# Patient Record
Sex: FEMALE | Race: OTHER | HISPANIC OR LATINO | URBAN - METROPOLITAN AREA
[De-identification: names, ages, dates, MRNs, and addresses within clinical notes are randomized per-mention and may not be internally consistent; named-entity substitution may affect disease eponyms.]

---

## 2023-05-12 ENCOUNTER — HOSPITAL ENCOUNTER (EMERGENCY)
Facility: HOSPITAL | Age: 38
Discharge: HOME | End: 2023-05-12
Attending: EMERGENCY MEDICINE

## 2023-05-12 VITALS
WEIGHT: 134.92 LBS | SYSTOLIC BLOOD PRESSURE: 126 MMHG | HEART RATE: 80 BPM | RESPIRATION RATE: 18 BRPM | DIASTOLIC BLOOD PRESSURE: 62 MMHG | OXYGEN SATURATION: 100 % | TEMPERATURE: 98.1 F

## 2023-05-12 DIAGNOSIS — N92.0 MENORRHAGIA: Primary | ICD-10-CM

## 2023-05-12 LAB
BACTERIA UR QL AUTO: ABNORMAL /HPF
BILIRUB UR QL STRIP: NEGATIVE
CLARITY UR: CLEAR
COLOR UR: ABNORMAL
EXT PREGNANCY TEST URINE: NEGATIVE
EXT. CONTROL: NORMAL
GLUCOSE UR STRIP-MCNC: NEGATIVE MG/DL
HGB UR QL STRIP.AUTO: ABNORMAL
KETONES UR STRIP-MCNC: NEGATIVE MG/DL
LEUKOCYTE ESTERASE UR QL STRIP: NEGATIVE
MUCOUS THREADS UR QL AUTO: ABNORMAL
NITRITE UR QL STRIP: NEGATIVE
NON-SQ EPI CELLS URNS QL MICRO: ABNORMAL /HPF
PH UR STRIP.AUTO: 6 [PH]
PROT UR STRIP-MCNC: NEGATIVE MG/DL
RBC #/AREA URNS AUTO: ABNORMAL /HPF
SP GR UR STRIP.AUTO: 1.01 (ref 1–1.03)
UROBILINOGEN UR STRIP-ACNC: <2 MG/DL
WBC #/AREA URNS AUTO: ABNORMAL /HPF

## 2023-05-12 RX ORDER — KETOROLAC TROMETHAMINE 30 MG/ML
15 INJECTION, SOLUTION INTRAMUSCULAR; INTRAVENOUS ONCE
Status: COMPLETED | OUTPATIENT
Start: 2023-05-12 | End: 2023-05-12

## 2023-05-12 RX ADMIN — KETOROLAC TROMETHAMINE 15 MG: 30 INJECTION, SOLUTION INTRAMUSCULAR; INTRAVENOUS at 15:55

## 2023-05-12 NOTE — Clinical Note
Nya Mcallister was seen and treated in our emergency department on 5/12/2023  Diagnosis:     Sheri Laya  is off the rest of the shift today  She may return on this date: If you have any questions or concerns, please don't hesitate to call        Arlene Soler, DO    ______________________________           _______________          _______________  Hospital Representative                              Date                                Time

## 2023-05-12 NOTE — ED PROVIDER NOTES
"History  Chief Complaint   Patient presents with   • Vaginal Bleeding     Vaginal bleeding since yesterday  44year old female, , with no major past medical history presents for evaluation with vaginal bleeding  She states that the bleeding started yesterday  She thought at first that it was just her normal menstrual period, but she says that she was a week late  She also noticed that she was bleeding more than normal, and seemed to be passing large clots  She says that over the past 24 hours, she has used approximately 4-5 pads  Today, she passed some \"tissue\" that did not appear to be a blood clot, and she became concerned  Patient states that she did take a home pregnancy test which was negative  She endorses associated lower abdominal and low back discomfort, more on the right side  She denies any recent fevers, chills, nausea, vomiting, or lightheadedness  Vaginal Bleeding  Associated symptoms: abdominal pain and back pain    Associated symptoms: no fever and no nausea        None       History reviewed  No pertinent past medical history  History reviewed  No pertinent surgical history  History reviewed  No pertinent family history  I have reviewed and agree with the history as documented  E-Cigarette/Vaping   • E-Cigarette Use Never User      E-Cigarette/Vaping Substances     Social History     Tobacco Use   • Smoking status: Never   • Smokeless tobacco: Never   Vaping Use   • Vaping Use: Never used   Substance Use Topics   • Drug use: Not Currently        Review of Systems   Constitutional: Negative for chills and fever  Respiratory: Negative for shortness of breath  Gastrointestinal: Positive for abdominal pain  Negative for nausea and vomiting  Genitourinary: Positive for pelvic pain and vaginal bleeding  Musculoskeletal: Positive for back pain  Neurological: Negative for light-headedness  All other systems reviewed and are negative        Physical Exam  ED Triage " Vitals   Temperature Pulse Respirations Blood Pressure SpO2   05/12/23 1412 05/12/23 1412 05/12/23 1412 05/12/23 1412 05/12/23 1412   98 1 °F (36 7 °C) 80 18 126/62 100 %      Temp src Heart Rate Source Patient Position - Orthostatic VS BP Location FiO2 (%)   -- -- 05/12/23 1412 05/12/23 1412 --     Sitting Right arm       Pain Score       05/12/23 1555       8             Orthostatic Vital Signs  Vitals:    05/12/23 1412   BP: 126/62   Pulse: 80   Patient Position - Orthostatic VS: Sitting       Physical Exam  Vitals and nursing note reviewed  Constitutional:       General: She is awake  She is not in acute distress  Appearance: She is not toxic-appearing  HENT:      Head: Normocephalic and atraumatic  Eyes:      General: Vision grossly intact  Gaze aligned appropriately  Cardiovascular:      Rate and Rhythm: Normal rate and regular rhythm  Heart sounds: Normal heart sounds  Pulmonary:      Effort: Pulmonary effort is normal  No respiratory distress  Breath sounds: Normal breath sounds  Abdominal:      General: There is no distension  Palpations: Abdomen is soft  Tenderness: There is abdominal tenderness (mild, lower abdomen)  Musculoskeletal:      Cervical back: Full passive range of motion without pain and neck supple  Skin:     General: Skin is warm and dry  Neurological:      General: No focal deficit present  Mental Status: She is alert and oriented to person, place, and time           ED Medications  Medications   ketorolac (TORADOL) injection 15 mg (15 mg Intramuscular Given 5/12/23 1555)       Diagnostic Studies  Results Reviewed     Procedure Component Value Units Date/Time    Urine Microscopic [[id number]]  (Abnormal) Collected: 05/12/23 1457    Lab Status: Final result Specimen: Urine, Clean Catch Updated: 05/12/23 1541     RBC, UA Innumerable /hpf      WBC, UA 10-20 /hpf      Epithelial Cells Occasional /hpf      Bacteria, UA None Seen /hpf      MUCUS THREADS Occasional    Urine culture [[id number]] Collected: 05/12/23 1454    Lab Status: In process Specimen: Urine, Clean Catch Updated: 05/12/23 1541    UA w Reflex to Microscopic w Reflex to Culture [[id number]]  (Abnormal) Collected: 05/12/23 1454    Lab Status: Final result Specimen: Urine, Clean Catch Updated: 05/12/23 1536     Color, UA Light Yellow     Clarity, UA Clear     Specific Gravity, UA 1 009     pH, UA 6 0     Leukocytes, UA Negative     Nitrite, UA Negative     Protein, UA Negative mg/dl      Glucose, UA Negative mg/dl      Ketones, UA Negative mg/dl      Urobilinogen, UA <2 0 mg/dl      Bilirubin, UA Negative     Occult Blood, UA Large    POCT pregnancy, urine [[id number]]  (Normal) Resulted: 05/12/23 1535    Lab Status: Final result Updated: 05/12/23 1535     EXT Preg Test, Ur Negative     Control Valid                 No orders to display         Procedures  Procedures      ED Course                             SBIRT 20yo+    Flowsheet Row Most Recent Value   Initial Alcohol Screen: US AUDIT-C     1  How often do you have a drink containing alcohol? 0 Filed at: 05/12/2023 1601   2  How many drinks containing alcohol do you have on a typical day you are drinking? 0 Filed at: 05/12/2023 1601   3a  Male UNDER 65: How often do you have five or more drinks on one occasion? 0 Filed at: 05/12/2023 1601   3b  FEMALE Any Age, or MALE 65+: How often do you have 4 or more drinks on one occassion? 0 Filed at: 05/12/2023 1601   Audit-C Score 0 Filed at: 05/12/2023 1601   KALEN: How many times in the past year have you    Used an illegal drug or used a prescription medication for non-medical reasons? Never Filed at: 05/12/2023 1601                Medical Decision Making  50-year-old female presents for evaluation with vaginal bleeding  Patient reports associated abdominal cramping and passage of some clots and tissue  Patient is not soaking through pads, and is only using 4-5 pads per day    On exam, patient with normal vitals, in no acute distress  Mild lower abdominal tenderness, otherwise benign exam   Differential diagnosis includes, but is not limited to, spontaneous , normal menstrual period, or other intrauterine pathology  Will check urine pregnancy, as well as UA  Patient's urine pregnancy test negative at this time, no concern for early pregnancy, spontaneous , or ectopic pregnancy  Urinalysis without signs of urinary tract infection  Suspect that patient's bleeding is just a heavy menstrual period, and that abdominal discomfort is just normal cramping  Patient given Toradol for symptomatic treatment  She was told to follow-up with her OB/GYN for further evaluation  Patient discharged home in stable condition with symptomatic care instructions and strict ED return precautions  Menorrhagia: acute illness or injury  Amount and/or Complexity of Data Reviewed  Labs: ordered  Risk  Prescription drug management  Disposition  Final diagnoses:   Menorrhagia     Time reflects when diagnosis was documented in both MDM as applicable and the Disposition within this note     Time User Action Codes Description Comment    2023  3:44 PM Giovanni Horacio Add [N93 9] Vaginal bleeding     2023  3:47 PM Giovanni Horacio Remove [N93 9] Vaginal bleeding     2023  3:47 PM Giovanni Horacio Add [N92 0] Menorrhagia       ED Disposition     ED Disposition   Discharge    Condition   Stable    Date/Time   Fri May 12, 2023  3:44 PM    Comment   Ara Pastor discharge to home/self care                 Follow-up Information     Follow up With Specialties Details Why Contact Info Additional [address] Ob/Gyn Obstetrics and Gynecology Schedule an appointment as soon as possible for a visit in 1 week if symptoms persist [address] Emergency Department Emergency Medicine Go to  If symptoms worsen [address] [place] [id number]  [address] [place] [address] Emergency Department, [address] [place], [place], [address]          There are no discharge medications for this patient  No discharge procedures on file  PDMP Review     None           ED Provider  Attending physically available and evaluated [facility]  I managed the patient along with the ED Attending      Electronically Signed by         Michael Mendes DO  05/12/23 1951

## 2023-05-12 NOTE — ED ATTENDING ATTESTATION
2023  ILoren, , saw and evaluated the patient  I have discussed the patient with the resident/non-physician practitioner and agree with the resident's/non-physician practitioner's findings, Plan of Care, and MDM as documented in the resident's/non-physician practitioner's note, except where noted  All available labs and Radiology studies were reviewed  I was present for key portions of any procedure(s) performed by the resident/non-physician practitioner and I was immediately available to provide assistance  At this point I agree with the current assessment done in the Emergency Department  I have conducted an independent evaluation of this patient a history and physical is as follows:37y  one week late for period, vaginal bleeding w/ clots starting yesterday  Reports lower abd cramping/low back pain  Denies sig lh, no n/v, no dysuria  No other co   Exam WNWD nad, mmm, neck supple, resp non-labored, cv regular rate, abd nd, ext no cce, skin dry, neuro non-focal, normal mood        ED Course     Labs Reviewed   UA W REFLEX TO MICROSCOPIC WITH REFLEX TO CULTURE - Abnormal       Result Value Ref Range Status    Color, UA Light Yellow   Final    Clarity, UA Clear   Final    Specific Gravity, UA 1 009  1 003 - 1 030 Final    pH, UA 6 0  4 5, 5 0, 5 5, 6 0, 6 5, 7 0, 7 5, 8 0 Final    Leukocytes, UA Negative  Negative Final    Nitrite, UA Negative  Negative Final    Protein, UA Negative  Negative mg/dl Final    Glucose, UA Negative  Negative mg/dl Final    Ketones, UA Negative  Negative mg/dl Final    Urobilinogen, UA <2 0  <2 0 mg/dl mg/dl Final    Bilirubin, UA Negative  Negative Final    Occult Blood, UA Large (*) Negative Final   URINE MICROSCOPIC - Abnormal    RBC, UA Innumerable (*) None Seen, 1-2 /hpf Final    WBC, UA 10-20 (*) None Seen, 1-2 /hpf Final    Epithelial Cells Occasional  None Seen, Occasional /hpf Final    Bacteria, UA None Seen  None Seen, Occasional /hpf Final MUCUS THREADS Occasional (*) None Seen Final   POCT PREGNANCY, URINE - Normal    EXT Preg Test, Ur Negative   Final    Control Valid   Final   URINE CULTURE     No orders to display         Critical Care Time  Procedures    1  Menorrhagia          Time reflects when diagnosis was documented in both MDM as applicable and the Disposition within this note     Time User Action Codes Description Comment    5/12/2023  3:44 PM Charmian Aid Add [N93 9] Vaginal bleeding     5/12/2023  3:47 PM Charmian Aid Remove [N93 9] Vaginal bleeding     5/12/2023  3:47 PM Charmian Aid Add [N92 0] Menorrhagia       ED Disposition     ED Disposition   Discharge    Condition   Stable    Date/Time   Fri May 12, 2023  3:44 PM    Comment   Sheri Pastor discharge to home/self care                 Follow-up Information     Follow up With Specialties Details Why Contact Info Additional Information    [facility] Ob/Gyn Obstetrics and Gynecology Schedule an appointment as soon as possible for a visit in 1 week if symptoms persist [address] [facility]       [address]  Emergency Department Emergency Medicine Go to  If symptoms worsen [facility] [phone]  [address] [facility] Emergency Department, [address] [facility] [address]  , [place], [place], [address]

## 2023-05-13 LAB — BACTERIA UR CULT: NORMAL

## 2024-02-03 ENCOUNTER — HOSPITAL ENCOUNTER (EMERGENCY)
Facility: HOSPITAL | Age: 39
Discharge: HOME | End: 2024-02-03
Attending: EMERGENCY MEDICINE
Payer: COMMERCIAL

## 2024-02-03 VITALS
OXYGEN SATURATION: 100 % | DIASTOLIC BLOOD PRESSURE: 60 MMHG | HEART RATE: 91 BPM | WEIGHT: 129.85 LBS | SYSTOLIC BLOOD PRESSURE: 92 MMHG | TEMPERATURE: 97.6 F | RESPIRATION RATE: 16 BRPM

## 2024-02-03 DIAGNOSIS — R10.2 PELVIC PAIN: ICD-10-CM

## 2024-02-03 DIAGNOSIS — K59.00 CONSTIPATION: ICD-10-CM

## 2024-02-03 DIAGNOSIS — D64.9 ANEMIA: Primary | ICD-10-CM

## 2024-02-03 LAB
ALBUMIN SERPL BCP-MCNC: 3.7 G/DL (ref 3.5–5)
ALP SERPL-CCNC: 41 U/L (ref 34–104)
ALT SERPL W P-5'-P-CCNC: 20 U/L (ref 7–52)
ANION GAP SERPL CALCULATED.3IONS-SCNC: 6 MMOL/L
ANISOCYTOSIS BLD QL SMEAR: PRESENT
AST SERPL W P-5'-P-CCNC: 20 U/L (ref 13–39)
BACTERIA UR QL AUTO: ABNORMAL /HPF
BASOPHILS # BLD AUTO: 0.01 THOUSANDS/ÂΜL (ref 0–0.1)
BASOPHILS NFR BLD AUTO: 0 % (ref 0–1)
BILIRUB DIRECT SERPL-MCNC: 0.1 MG/DL (ref 0–0.2)
BILIRUB SERPL-MCNC: 0.36 MG/DL (ref 0.2–1)
BILIRUB UR QL STRIP: ABNORMAL
BUN SERPL-MCNC: 6 MG/DL (ref 5–25)
CALCIUM SERPL-MCNC: 8.8 MG/DL (ref 8.4–10.2)
CHLORIDE SERPL-SCNC: 104 MMOL/L (ref 96–108)
CLARITY UR: CLEAR
CO2 SERPL-SCNC: 27 MMOL/L (ref 21–32)
COLOR UR: ABNORMAL
CREAT SERPL-MCNC: 0.68 MG/DL (ref 0.6–1.3)
EOSINOPHIL # BLD AUTO: 0.06 THOUSAND/ÂΜL (ref 0–0.61)
EOSINOPHIL NFR BLD AUTO: 1 % (ref 0–6)
ERYTHROCYTE [DISTWIDTH] IN BLOOD BY AUTOMATED COUNT: 27.1 % (ref 11.6–15.1)
EXT PREGNANCY TEST URINE: NEGATIVE
EXT. CONTROL: NORMAL
GFR SERPL CREATININE-BSD FRML MDRD: 110 ML/MIN/1.73SQ M
GLUCOSE SERPL-MCNC: 111 MG/DL (ref 65–140)
GLUCOSE UR STRIP-MCNC: NEGATIVE MG/DL
HCT VFR BLD AUTO: 24.6 % (ref 34.8–46.1)
HGB BLD-MCNC: 7.9 G/DL (ref 11.5–15.4)
HGB UR QL STRIP.AUTO: ABNORMAL
HYPERCHROMIA BLD QL SMEAR: PRESENT
IMM GRANULOCYTES # BLD AUTO: 0.02 THOUSAND/UL (ref 0–0.2)
IMM GRANULOCYTES NFR BLD AUTO: 0 % (ref 0–2)
KETONES UR STRIP-MCNC: NEGATIVE MG/DL
LEUKOCYTE ESTERASE UR QL STRIP: NEGATIVE
LIPASE SERPL-CCNC: 10 U/L (ref 11–82)
LYMPHOCYTES # BLD AUTO: 0.72 THOUSANDS/ÂΜL (ref 0.6–4.47)
LYMPHOCYTES NFR BLD AUTO: 8 % (ref 14–44)
MAGNESIUM SERPL-MCNC: 1.9 MG/DL (ref 1.9–2.7)
MCH RBC QN AUTO: 25.9 PG (ref 26.8–34.3)
MCHC RBC AUTO-ENTMCNC: 32.1 G/DL (ref 31.4–37.4)
MCV RBC AUTO: 81 FL (ref 82–98)
MONOCYTES # BLD AUTO: 0.59 THOUSAND/ÂΜL (ref 0.17–1.22)
MONOCYTES NFR BLD AUTO: 7 % (ref 4–12)
MUCOUS THREADS UR QL AUTO: ABNORMAL
NEUTROPHILS # BLD AUTO: 7.36 THOUSANDS/ÂΜL (ref 1.85–7.62)
NEUTS SEG NFR BLD AUTO: 84 % (ref 43–75)
NITRITE UR QL STRIP: NEGATIVE
NON-SQ EPI CELLS URNS QL MICRO: ABNORMAL /HPF
NRBC BLD AUTO-RTO: 0 /100 WBCS
OVALOCYTES BLD QL SMEAR: PRESENT
PH UR STRIP.AUTO: 7 [PH] (ref 4.5–8)
PLATELET # BLD AUTO: 250 THOUSANDS/UL (ref 149–390)
PLATELET BLD QL SMEAR: ADEQUATE
PMV BLD AUTO: 10.1 FL (ref 8.9–12.7)
POLYCHROMASIA BLD QL SMEAR: PRESENT
POTASSIUM SERPL-SCNC: 3.9 MMOL/L (ref 3.5–5.3)
PROT SERPL-MCNC: 6.6 G/DL (ref 6.4–8.4)
PROT UR STRIP-MCNC: ABNORMAL MG/DL
RBC # BLD AUTO: 3.05 MILLION/UL (ref 3.81–5.12)
RBC #/AREA URNS AUTO: ABNORMAL /HPF
RBC MORPH BLD: PRESENT
SODIUM SERPL-SCNC: 137 MMOL/L (ref 135–147)
SP GR UR STRIP.AUTO: 1.02 (ref 1–1.03)
UROBILINOGEN UR QL STRIP.AUTO: 0.2 E.U./DL
WBC # BLD AUTO: 8.76 THOUSAND/UL (ref 4.31–10.16)
WBC #/AREA URNS AUTO: ABNORMAL /HPF

## 2024-02-03 PROCEDURE — 81001 URINALYSIS AUTO W/SCOPE: CPT

## 2024-02-03 PROCEDURE — 80048 BASIC METABOLIC PNL TOTAL CA: CPT | Performed by: EMERGENCY MEDICINE

## 2024-02-03 PROCEDURE — 96375 TX/PRO/DX INJ NEW DRUG ADDON: CPT

## 2024-02-03 PROCEDURE — 36415 COLL VENOUS BLD VENIPUNCTURE: CPT | Performed by: EMERGENCY MEDICINE

## 2024-02-03 PROCEDURE — 80076 HEPATIC FUNCTION PANEL: CPT | Performed by: EMERGENCY MEDICINE

## 2024-02-03 PROCEDURE — 99283 EMERGENCY DEPT VISIT LOW MDM: CPT

## 2024-02-03 PROCEDURE — 83735 ASSAY OF MAGNESIUM: CPT | Performed by: EMERGENCY MEDICINE

## 2024-02-03 PROCEDURE — 96374 THER/PROPH/DIAG INJ IV PUSH: CPT

## 2024-02-03 PROCEDURE — 85025 COMPLETE CBC W/AUTO DIFF WBC: CPT | Performed by: EMERGENCY MEDICINE

## 2024-02-03 PROCEDURE — 83690 ASSAY OF LIPASE: CPT | Performed by: EMERGENCY MEDICINE

## 2024-02-03 PROCEDURE — 99284 EMERGENCY DEPT VISIT MOD MDM: CPT | Performed by: EMERGENCY MEDICINE

## 2024-02-03 PROCEDURE — 96361 HYDRATE IV INFUSION ADD-ON: CPT

## 2024-02-03 PROCEDURE — 81025 URINE PREGNANCY TEST: CPT | Performed by: EMERGENCY MEDICINE

## 2024-02-03 RX ORDER — NAPROXEN 500 MG/1
500 TABLET ORAL 2 TIMES DAILY WITH MEALS
Qty: 20 TABLET | Refills: 0 | Status: SHIPPED | OUTPATIENT
Start: 2024-02-03 | End: 2024-02-13

## 2024-02-03 RX ORDER — DOCUSATE SODIUM 100 MG/1
100 CAPSULE, LIQUID FILLED ORAL EVERY 12 HOURS
Qty: 60 CAPSULE | Refills: 0 | Status: SHIPPED | OUTPATIENT
Start: 2024-02-03

## 2024-02-03 RX ORDER — ONDANSETRON 2 MG/ML
4 INJECTION INTRAMUSCULAR; INTRAVENOUS ONCE
Status: COMPLETED | OUTPATIENT
Start: 2024-02-03 | End: 2024-02-03

## 2024-02-03 RX ORDER — KETOROLAC TROMETHAMINE 30 MG/ML
15 INJECTION, SOLUTION INTRAMUSCULAR; INTRAVENOUS ONCE
Status: COMPLETED | OUTPATIENT
Start: 2024-02-03 | End: 2024-02-03

## 2024-02-03 RX ADMIN — SODIUM CHLORIDE 1000 ML: 0.9 INJECTION, SOLUTION INTRAVENOUS at 13:36

## 2024-02-03 RX ADMIN — KETOROLAC TROMETHAMINE 15 MG: 30 INJECTION, SOLUTION INTRAMUSCULAR; INTRAVENOUS at 13:35

## 2024-02-03 RX ADMIN — ONDANSETRON 4 MG: 2 INJECTION INTRAMUSCULAR; INTRAVENOUS at 13:35

## 2024-02-03 NOTE — ED PROVIDER NOTES
History  Chief Complaint   Patient presents with    Abdominal Pain     Pt reports lower abdominal pain beginning 26th. States was on period and noted more bleeding than usual. States light bleeding still. nausea     38 YO female presents with lower abdominal pain for the last 7 days. She states pain has been pulsing, constant, waxing and waning. She has had associated nausea, constipation. She notes her last bowel movement was yesterday. She notes heavy period that began 2 days prior, this included clots. She was evaluated in another ED twice, saw a gynecologist and her PCP for this. Testing did demonstrate a fibroid uterus which was felt to be the cause for her symptoms. Notes bleeding has improved though she continues to have some spotting. She does have known anemia. Pt denies CP/SOB/F/C/D/C, no dysuria, burning on urination or blood in urine.       History provided by:  Patient   used: No        None       History reviewed. No pertinent past medical history.    History reviewed. No pertinent surgical history.    History reviewed. No pertinent family history.  I have reviewed and agree with the history as documented.    E-Cigarette/Vaping    E-Cigarette Use Never User      E-Cigarette/Vaping Substances     Social History     Tobacco Use    Smoking status: Never    Smokeless tobacco: Never   Vaping Use    Vaping status: Never Used   Substance Use Topics    Drug use: Not Currently       Review of Systems   Constitutional:  Negative for chills, fatigue and fever.   HENT:  Negative for dental problem.    Eyes:  Negative for visual disturbance.   Respiratory:  Negative for shortness of breath.    Cardiovascular:  Negative for chest pain.   Gastrointestinal:  Negative for abdominal pain, diarrhea and vomiting.   Genitourinary:  Positive for pelvic pain and vaginal bleeding. Negative for dysuria and frequency.   Musculoskeletal:  Negative for arthralgias.   Skin:  Negative for rash.   Neurological:   Negative for dizziness, weakness and light-headedness.   Psychiatric/Behavioral:  Negative for agitation, behavioral problems and confusion.    All other systems reviewed and are negative.      Physical Exam  Physical Exam  Vitals and nursing note reviewed.   Constitutional:       Appearance: Normal appearance.   HENT:      Head: Normocephalic and atraumatic.   Eyes:      Extraocular Movements: Extraocular movements intact.      Conjunctiva/sclera: Conjunctivae normal.   Cardiovascular:      Rate and Rhythm: Normal rate.   Pulmonary:      Effort: Pulmonary effort is normal.   Abdominal:      General: There is no distension.      Tenderness: There is abdominal tenderness in the right lower quadrant, suprapubic area and left lower quadrant. There is no guarding or rebound.   Musculoskeletal:         General: Normal range of motion.      Cervical back: Normal range of motion.   Skin:     Findings: No rash.   Neurological:      General: No focal deficit present.      Mental Status: She is alert.      Cranial Nerves: No cranial nerve deficit.   Psychiatric:         Mood and Affect: Mood normal.         Vital Signs  ED Triage Vitals   Temperature Pulse Respirations Blood Pressure SpO2   02/03/24 1229 02/03/24 1229 02/03/24 1229 02/03/24 1229 02/03/24 1229   97.6 °F (36.4 °C) (!) 116 16 116/66 100 %      Temp Source Heart Rate Source Patient Position - Orthostatic VS BP Location FiO2 (%)   02/03/24 1229 02/03/24 1229 -- -- --   Oral Monitor         Pain Score       02/03/24 1335       8           Vitals:    02/03/24 1229 02/03/24 1543   BP: 116/66 92/60   Pulse: (!) 116 91         Visual Acuity      ED Medications  Medications   sodium chloride 0.9 % bolus 1,000 mL (1,000 mL Intravenous New Bag 2/3/24 1336)   ondansetron (ZOFRAN) injection 4 mg (4 mg Intravenous Given 2/3/24 1335)   ketorolac (TORADOL) injection 15 mg (15 mg Intravenous Given 2/3/24 1335)       Diagnostic Studies  Results Reviewed       Procedure Component  Value Units Date/Time    Smear Review(Phlebs Do Not Order) [[id number]] Collected: 02/03/24 1341    Lab Status: Final result Specimen: Blood from Arm, Left Updated: 02/03/24 1425     RBC Morphology Present     Platelet Estimate Adequate     Anisocytosis Present     Hypochromia Present     Ovalocytes Present     Polychromasia Present    CBC and differential [[id number]]  (Abnormal) Collected: 02/03/24 1341    Lab Status: Final result Specimen: Blood from Arm, Left Updated: 02/03/24 1425     WBC 8.76 Thousand/uL      RBC 3.05 Million/uL      Hemoglobin 7.9 g/dL      Hematocrit 24.6 %      MCV 81 fL      MCH 25.9 pg      MCHC 32.1 g/dL      RDW 27.1 %      MPV 10.1 fL      Platelets 250 Thousands/uL      nRBC 0 /100 WBCs      Neutrophils Relative 84 %      Immat GRANS % 0 %      Lymphocytes Relative 8 %      Monocytes Relative 7 %      Eosinophils Relative 1 %      Basophils Relative 0 %      Neutrophils Absolute 7.36 Thousands/µL      Immature Grans Absolute 0.02 Thousand/uL      Lymphocytes Absolute 0.72 Thousands/µL      Monocytes Absolute 0.59 Thousand/µL      Eosinophils Absolute 0.06 Thousand/µL      Basophils Absolute 0.01 Thousands/µL     Narrative:      This is an appended report.  These results have been appended to a previously verified report.    Basic metabolic panel [[id number]] Collected: 02/03/24 1341    Lab Status: Final result Specimen: Blood from Arm, Left Updated: 02/03/24 1407     Sodium 137 mmol/L      Potassium 3.9 mmol/L      Chloride 104 mmol/L      CO2 27 mmol/L      ANION GAP 6 mmol/L      BUN 6 mg/dL      Creatinine 0.68 mg/dL      Glucose 111 mg/dL      Calcium 8.8 mg/dL      eGFR 110 ml/min/1.73sq m     Narrative:      National Kidney Disease Foundation guidelines for Chronic Kidney Disease (CKD):     Stage 1 with normal or high GFR (GFR > 90 mL/min/1.73 square meters)    Stage 2 Mild CKD (GFR = 60-89 mL/min/1.73 square meters)    Stage 3A Moderate CKD (GFR = 45-59 mL/min/1.73 square  meters)    Stage 3B Moderate CKD (GFR = 30-44 mL/min/1.73 square meters)    Stage 4 Severe CKD (GFR = 15-29 mL/min/1.73 square meters)    Stage 5 End Stage CKD (GFR <15 mL/min/1.73 square meters)  Note: GFR calculation is accurate only with a steady state creatinine    Hepatic function panel [[id number]]  (Normal) Collected: 02/03/24 1341    Lab Status: Final result Specimen: Blood from Arm, Left Updated: 02/03/24 1407     Total Bilirubin 0.36 mg/dL      Bilirubin, Direct 0.10 mg/dL      Alkaline Phosphatase 41 U/L      AST 20 U/L      ALT 20 U/L      Total Protein 6.6 g/dL      Albumin 3.7 g/dL     Magnesium [[id number]]  (Normal) Collected: 02/03/24 1341    Lab Status: Final result Specimen: Blood from Arm, Left Updated: 02/03/24 1407     Magnesium 1.9 mg/dL     Lipase [[id number]]  (Abnormal) Collected: 02/03/24 1341    Lab Status: Final result Specimen: Blood from Arm, Left Updated: 02/03/24 1407     Lipase 10 u/L     Urine Microscopic [[id number]]  (Abnormal) Collected: 02/03/24 1305    Lab Status: Final result Specimen: Urine, Clean Catch Updated: 02/03/24 1401     RBC, UA 2-4 /hpf      WBC, UA 1-2 /hpf      Epithelial Cells Occasional /hpf      Bacteria, UA Occasional /hpf      MUCUS THREADS Innumerable    POCT pregnancy, urine [[id number]]  (Normal) Resulted: 02/03/24 1310    Lab Status: Final result Updated: 02/03/24 1310     EXT Preg Test, Ur Negative     Control Valid    Urine Macroscopic, POC [[id number]]  (Abnormal) Collected: 02/03/24 1305    Lab Status: Final result Specimen: Urine Updated: 02/03/24 1306     Color, UA Simran     Clarity, UA Clear     pH, UA 7.0     Leukocytes, UA Negative     Nitrite, UA Negative     Protein, UA 30 (1+) mg/dl      Glucose, UA Negative mg/dl      Ketones, UA Negative mg/dl      Urobilinogen, UA 0.2 E.U./dl      Bilirubin, UA Small     Occult Blood, UA Trace     Specific Gravity, UA 1.020    Narrative:      CLINITEK RESULT                   No orders to display               Procedures  Procedures         ED Course  ED Course as of 02/03/24 1544   Sat Feb 03, 2024   1440 Hemoglobin(!): 7.9  8.7 four days ago, patient states bleeding has improved, currently with spotting.                                             Medical Decision Making  1. Abdominal pain - Patient recently evaluated at [facility], labs which demonstrated anemia and an U/S with fibroid uterus. Will recheck CBC to determine extent of anemia, metabolic panel for electrolyte abnormalities and dehydration, will give fluids, NSAIDs for discomfort.    Problems Addressed:  Anemia: chronic illness or injury  Constipation: acute illness or injury  Pelvic pain: chronic illness or injury    Amount and/or Complexity of Data Reviewed  External Data Reviewed: notes.  Labs: ordered. Decision-making details documented in ED Course.    Risk  OTC drugs.  Prescription drug management.             Disposition  Final diagnoses:   Anemia   Constipation   Pelvic pain     Time reflects when diagnosis was documented in both MDM as applicable and the Disposition within this note       Time User Action Codes Description Comment    2/3/2024  3:26 PM Marlon Ward [D64.9] Anemia     2/3/2024  3:26 PM Marlon Ward Add [K59.00] Constipation     2/3/2024  3:26 PM Marlon Ward Add [R10.2] Pelvic pain           ED Disposition       ED Disposition   Discharge    Condition   Stable    Date/Time   Sat Feb 3, 2024  3:26 PM    Comment   Justin Pastor discharge to home/self care.                   Follow-up Information    None         Patient's Medications   Discharge Prescriptions    DOCUSATE SODIUM (COLACE) 100 MG CAPSULE    Take 1 capsule (100 mg total) by mouth every 12 (twelve) hours       Start Date: 2/3/2024  End Date: --       Order Dose: 100 mg       Quantity: 60 capsule    Refills: 0    NAPROXEN (NAPROSYN) 500 MG TABLET    Take 1 tablet (500 mg total) by mouth 2 (two) times a day with meals for 10 days       Start Date: 2/3/2024   End Date: 2/13/2024       Order Dose: 500 mg       Quantity: 20 tablet    Refills: 0       No discharge procedures on file.    PDMP Review       None            ED Provider  Electronically Signed by             Marlon Ward MD  02/03/24 9111

## 2024-02-03 NOTE — DISCHARGE INSTRUCTIONS
Take the Naprosyn twice daily for the next 5-10 days.    Start taking your iron supplement every day instead of every other day.    Take the Colace twice daily while you take the iron.    Follow back up with your gynecologist for further evaluation and management. Return to the ER for worsening shortness of breath, fatigue and lightheadedness.    [place] Naprosyn dos veces al día dot los próximos 5 a 10 días.    Empiece a sapna [place] suplemento de micheal todos los días en lugar de cada dos jerad.    [place] Colace dos veces al día mientras prudencio la plancha.    Vuelva a consultar con angeles ginecólogo para agustín evaluación y tratamiento adicionales. Regrese a la gabby de emergencias si empeora la dificultad para respirar, la fatiga y el aturdimiento.

## 2024-02-14 PROBLEM — D25.1 INTRAMURAL UTERINE FIBROID: Status: ACTIVE | Noted: 2024-02-02

## 2024-02-14 PROBLEM — A74.9 CHLAMYDIA: Status: ACTIVE | Noted: 2024-01-18

## 2024-02-14 NOTE — PROGRESS NOTES
Patient is primarily Yakut speaking [employer]  # [id number], Gi henley for translation during today's visit.     Assessment/Plan:      Diagnoses and all orders for this visit:    Anemia, unspecified type  -     TSH, 3rd generation; Future  -     T4, free; Future  -     medroxyPROGESTERone (DEPO-PROVERA) 150 mg/mL injection; Inject 1 mL (150 mg total) into a muscle every 3 (three) months    Menorrhagia with regular cycle  -     TSH, 3rd generation; Future  -     T4, free; Future  -     medroxyPROGESTERone (DEPO-PROVERA) 150 mg/mL injection; Inject 1 mL (150 mg total) into a muscle every 3 (three) months    Uterine leiomyoma, unspecified location  -     medroxyPROGESTERone (DEPO-PROVERA) 150 mg/mL injection; Inject 1 mL (150 mg total) into a muscle every 3 (three) months    Other orders  -     norethindrone (AYGESTIN) 5 mg tablet; Take 10 mg by mouth daily  -     Tranexamic Acid 650 MG TABS; Take 1,300 mg by mouth 3 (three) times a day as needed  -     Ferrous Sulfate Dried 200 (65 Fe) MG TABS; Take 1 tablet by mouth 2 (two) times a day      - reviewed options for menstrual control including  LARCs.  Reviewed endometrial ablation and briefly hysterectomy.  Patient is most interested in Depo-Provera injections at this time.  Considering pregnancy.  Written information provided regarding Mirena IUD  -Reviewed with patient Depo-Provera injections are every 3 months.  Common side effects are irregular vaginal bleeding including amenorrhea and weight gain.  Written information provided.  Aware of delayed return of fertility  -Patient will continue Aygestin 10 mg for 2 months.  Has Lysteda as needed if heavy bleeding returns.  -Reviewed with patient tubal occlusion and if she desires pregnancy would need referral for [facility] and in vitro fertilization.  Partner is currently in [place].  Is not interested at this time in pursuing pregnancy  - encouraged to follow up with hematology for iron infusions.  Encouraged  to continue oral iron as ordered  -Signs and symptoms to report reviewed    RTO for Depo-Provera injections and 3 to 4 months for annual exam/ follow-up    Subjective:     Patient ID: Justin Pastor is a 38 y.o. female.    HPI  here to discuss AUB. Menarche at age 11. LMP 23  Menses are typically monthly  lasting 7 days . Bleeding is described as heavy for 3 days.  Necessitating pad changes hourly.  LMP  24 had prolonged bleeding up until about 1 week ago.  Bleeding is described as daily and heavy.  Was changing a pad 2-3 times per hour.  Seen in  ED 2/3/24 H/H 7.9.6. Known history of anemia. Has met with hematology and received IV iron infusions in the past. Has known - stable fibroid since 2023 approximately 6cm.  Was prescribed lysteda and aygestin within the past month.  Denies associated symptoms.  Denies alleviating or aggravating factors.  Is not using any other OTC remedies.  Has met with Dr. Alves for hysterectomy consult patient was not interested in hysterectomy at that time. Is sexually active partner in  [place]. HSG resulted bilateral tubal occlusions.  is interested in pregnancy. Goal of today's visit lighter/more regular menstrual cycles    Pelvic US - 24  FINDINGS:   The anteverted uterus measures 11.4 x 7.1 x 9.4 cm. There is a large intramural   right uterine body fibroid measuring 6.7 x 6.0 x 6.3 cm. Endometrial complex is   not well seen.   The right ovary is 4.5 x 1.8 x 2.6 cm; the left ovary is 3.9 x 2.0 x 4.2 cm. No   dominant follicle. Color and spectral Doppler demonstrates normal blood flow to   each ovary.   No significant free fluid       Last pap smear 22 NILM/ HR HPV(-)  Gonorrhea/ chlamydia - chlamydia + 24     Review of Systems   Constitutional:  Positive for fatigue. Negative for chills and fever.   Respiratory: Negative.     Cardiovascular: Negative.    Genitourinary:  Positive for menstrual problem and vaginal bleeding.  "Negative for decreased urine volume, difficulty urinating, dyspareunia, dysuria, enuresis, flank pain, frequency, genital sores, hematuria, pelvic pain, urgency, vaginal discharge and vaginal pain.   Neurological:  Negative for dizziness, light-headedness and headaches.         Objective:  /62   Ht 5' 5\" (1.651 m)   Wt 64.2 kg (141 lb 9.6 oz)   LMP 01/24/2024 (Exact Date)   BMI 23.56 kg/m²      Physical Exam  Constitutional:       General: She is not in acute distress.     Appearance: Normal appearance. She is not ill-appearing.   Cardiovascular:      Rate and Rhythm: Normal rate and regular rhythm.      Heart sounds: Normal heart sounds.   Pulmonary:      Effort: Pulmonary effort is normal.      Breath sounds: Normal breath sounds.   Neurological:      Mental Status: She is alert and oriented to person, place, and time.   Psychiatric:         Mood and Affect: Mood normal.         Behavior: Behavior normal.           "

## 2024-02-19 ENCOUNTER — OFFICE VISIT (OUTPATIENT)
Dept: OBGYN CLINIC | Facility: MEDICAL CENTER | Age: 39
End: 2024-02-19
Payer: COMMERCIAL

## 2024-02-19 VITALS
BODY MASS INDEX: 23.59 KG/M2 | WEIGHT: 141.6 LBS | DIASTOLIC BLOOD PRESSURE: 62 MMHG | HEIGHT: 65 IN | SYSTOLIC BLOOD PRESSURE: 114 MMHG

## 2024-02-19 DIAGNOSIS — N92.0 MENORRHAGIA WITH REGULAR CYCLE: ICD-10-CM

## 2024-02-19 DIAGNOSIS — D25.9 UTERINE LEIOMYOMA, UNSPECIFIED LOCATION: ICD-10-CM

## 2024-02-19 DIAGNOSIS — D64.9 ANEMIA, UNSPECIFIED TYPE: Primary | ICD-10-CM

## 2024-02-19 PROCEDURE — 99203 OFFICE O/P NEW LOW 30 MIN: CPT | Performed by: NURSE PRACTITIONER

## 2024-02-19 RX ORDER — MEDROXYPROGESTERONE ACETATE 150 MG/ML
150 INJECTION, SUSPENSION INTRAMUSCULAR
Qty: 1 ML | Refills: 3 | Status: SHIPPED | OUTPATIENT
Start: 2024-02-19

## 2024-02-19 RX ORDER — TRANEXAMIC ACID 650 MG/1
1300 TABLET ORAL 3 TIMES DAILY PRN
COMMUNITY
Start: 2023-12-28

## 2024-03-08 DIAGNOSIS — D64.9 ANEMIA, UNSPECIFIED TYPE: ICD-10-CM

## 2024-03-08 DIAGNOSIS — D25.9 UTERINE LEIOMYOMA, UNSPECIFIED LOCATION: ICD-10-CM

## 2024-03-08 DIAGNOSIS — N92.0 MENORRHAGIA WITH REGULAR CYCLE: ICD-10-CM

## 2024-03-08 RX ORDER — MEDROXYPROGESTERONE ACETATE 150 MG/ML
150 INJECTION, SUSPENSION INTRAMUSCULAR
Qty: 1 ML | Refills: 3 | Status: CANCELLED | OUTPATIENT
Start: 2024-03-08

## 2024-03-08 NOTE — TELEPHONE ENCOUNTER
Medication: medroxyPROGESTERone (DEPO-PROVERA) 150 mg/mL injection     Dose/Frequency: 150 mg, Intramuscular, Every 3 months     Quantity: 1     Pharmacy: [id number] - [place] - [address] [place] [address] [phone]     Office:   [] PCP/Provider -   [] Speciality/Provider -     Does the patient have enough for 3 days?   [x] Yes   [] No - Send as HP to POD

## 2024-03-08 NOTE — TELEPHONE ENCOUNTER
Patient called.  She wants to know if she can get the depo shot when she has her period.  She has a return visit in May.  She needs to scheduled her depo shot.  Please call back.  She needs a .

## 2024-03-11 ENCOUNTER — TELEPHONE (OUTPATIENT)
Age: 39
End: 2024-03-11

## 2024-03-11 ENCOUNTER — CLINICAL SUPPORT (OUTPATIENT)
Dept: OBGYN CLINIC | Facility: MEDICAL CENTER | Age: 39
End: 2024-03-11
Payer: COMMERCIAL

## 2024-03-11 ENCOUNTER — TELEPHONE (OUTPATIENT)
Dept: OBGYN CLINIC | Facility: CLINIC | Age: 39
End: 2024-03-11

## 2024-03-11 VITALS — HEIGHT: 65 IN | BODY MASS INDEX: 23.56 KG/M2

## 2024-03-11 DIAGNOSIS — Z30.42 ENCOUNTER FOR MANAGEMENT AND INJECTION OF DEPO-PROVERA: Primary | ICD-10-CM

## 2024-03-11 PROCEDURE — 96372 THER/PROPH/DIAG INJ SC/IM: CPT

## 2024-03-11 RX ORDER — MEDROXYPROGESTERONE ACETATE 150 MG/ML
150 INJECTION, SUSPENSION INTRAMUSCULAR
Status: SHIPPED | OUTPATIENT
Start: 2024-03-11

## 2024-03-11 RX ADMIN — MEDROXYPROGESTERONE ACETATE 150 MG: 150 INJECTION, SUSPENSION INTRAMUSCULAR at 14:38

## 2024-03-11 NOTE — TELEPHONE ENCOUNTER
Patient called to advise they are running late to their 1:15 appt today. Communicated our 15 minute late policy and advised if they arrive past that timeframe they may need to reschedule. Patient verbalized understanding.

## 2024-03-11 NOTE — PROGRESS NOTES
Pt showed for inj.  Pt did well  Given on Left Deltoid     NDC [id number]  LOT [id number]  EXP 03/2026

## 2024-03-11 NOTE — TELEPHONE ENCOUNTER
I called pt and let her  know that she can get the depo shot  with her period and schedule appt with the nurse 03/11

## 2024-05-06 ENCOUNTER — OFFICE VISIT (OUTPATIENT)
Dept: OBGYN CLINIC | Facility: MEDICAL CENTER | Age: 39
End: 2024-05-06
Payer: COMMERCIAL

## 2024-05-06 VITALS
WEIGHT: 139.7 LBS | SYSTOLIC BLOOD PRESSURE: 100 MMHG | HEIGHT: 65 IN | BODY MASS INDEX: 23.28 KG/M2 | DIASTOLIC BLOOD PRESSURE: 70 MMHG

## 2024-05-06 DIAGNOSIS — D25.9 UTERINE LEIOMYOMA, UNSPECIFIED LOCATION: ICD-10-CM

## 2024-05-06 DIAGNOSIS — N92.0 MENORRHAGIA WITH REGULAR CYCLE: ICD-10-CM

## 2024-05-06 DIAGNOSIS — D64.9 ANEMIA, UNSPECIFIED TYPE: ICD-10-CM

## 2024-05-06 DIAGNOSIS — Z01.419 WELL WOMAN EXAM WITH ROUTINE GYNECOLOGICAL EXAM: Primary | ICD-10-CM

## 2024-05-06 DIAGNOSIS — Z12.31 BREAST CANCER SCREENING BY MAMMOGRAM: ICD-10-CM

## 2024-05-06 PROCEDURE — 99395 PREV VISIT EST AGE 18-39: CPT | Performed by: NURSE PRACTITIONER

## 2024-05-06 RX ORDER — MEDROXYPROGESTERONE ACETATE 150 MG/ML
150 INJECTION, SUSPENSION INTRAMUSCULAR
Qty: 1 ML | Refills: 3 | Status: SHIPPED | OUTPATIENT
Start: 2024-05-06

## 2024-05-06 NOTE — PROGRESS NOTES
"Patient is primarily Citizen of [place]-speaking [employer]  # [id number] used for translation during today's visit    Subjective      Justin Pastor is a 38 y.o. female who presents for annual well woman exam.  Last Pap smear 22 NILM/ HR HPV(-). Periods are irregular with Depo. Started Depo Provera for menorrhagia/ anemia 2024 since that time symptoms have improved. No intermenstrual bleeding, spotting, or discharge.    Current contraception: Depo-Provera injections  History of abnormal Pap smear: no  Family history of uterine or ovarian cancer: no  Regular self breast exam: no  History of abnormal mammogram: Mammograms to begin at age 40 unless otherwise clinically indicated  Family history of breast cancer: no  History of abnormal lipids: no  Gardasil vaccine: no      Menstrual History:  OB History          3    Para   3    Term   3            AB        Living   3         SAB        IAB        Ectopic        Multiple        Live Births   3           Obstetric Comments   Menarche age 11              Menarche age: 11  Patient's last menstrual period was 2024 (approximate).  Period Pattern: (!) Irregular    The following portions of the patient's history were reviewed and updated as appropriate: allergies, current medications, past family history, past medical history, past social history, past surgical history, and problem list.    Review of Systems  Pertinent items are noted in HPI.      Objective      /70   Ht 5' 5\" (1.651 m)   Wt 63.4 kg (139 lb 11.2 oz)   LMP 2024 (Approximate)   BMI 23.25 kg/m²     General:   alert and oriented, in no acute distress, alert, appears stated age, and cooperative   Heart: regular rate and rhythm, S1, S2 normal, no murmur, click, rub or gallop   Lungs: clear to auscultation bilaterally   Abdomen: soft, non-tender, without masses or organomegaly   Vulva: normal, Bartholin's, Urethra, Wyndham's normal, female escutcheon   Vagina: normal mucosa, " normal discharge, no palpable nodules   Cervix: no cervical motion tenderness and no lesions   Uterus: normal size, non-tender, normal shape and consistency   Adnexa: normal adnexa and no mass, fullness, tenderness   Breast: breasts appear normal, no suspicious masses, no skin or nipple changes or axillary nodes.              Assessment      @well woman  no contraindication to continue hormonal therapy@ .     Plan      All questions answered.  Breast self exam technique reviewed and patient encouraged to perform self-exam monthly.  Contraception: Depo-Provera injections.  Diagnosis explained in detail, including differential.  Dietary diary.  Discussed healthy lifestyle modifications.  Educational material distributed.  Follow up in for Depo-Provera injection and 1 year for annual exam.  Follow up as needed.  Mammogram.  Breast awareness reviewed   Menorrhagia/anemia/stable fibroid repeat CBC and iron panel ordered.  Encouraged to continue oral iron and Depo-Provera injections  Encouraged healthy diet, exercise and lifestyle  Encouraged follow-up with PCP as needed  Written information provided about Gardasil vaccine series  Will call / [employer] message with results

## 2024-05-10 ENCOUNTER — HOSPITAL ENCOUNTER (EMERGENCY)
Facility: HOSPITAL | Age: 39
Discharge: HOME | End: 2024-05-10
Attending: EMERGENCY MEDICINE
Payer: OTHER MISCELLANEOUS

## 2024-05-10 VITALS
SYSTOLIC BLOOD PRESSURE: 122 MMHG | WEIGHT: 139.55 LBS | RESPIRATION RATE: 16 BRPM | DIASTOLIC BLOOD PRESSURE: 79 MMHG | BODY MASS INDEX: 23.22 KG/M2 | TEMPERATURE: 98.2 F | HEART RATE: 68 BPM | OXYGEN SATURATION: 100 %

## 2024-05-10 DIAGNOSIS — S06.0XAA CONCUSSION: Primary | ICD-10-CM

## 2024-05-10 PROCEDURE — 99283 EMERGENCY DEPT VISIT LOW MDM: CPT

## 2024-05-10 PROCEDURE — 99284 EMERGENCY DEPT VISIT MOD MDM: CPT | Performed by: EMERGENCY MEDICINE

## 2024-05-10 NOTE — DISCHARGE INSTRUCTIONS
Follow up with occupational health only if needed for concussion symptoms.    If you develop new or worsening symptoms, please return to the Emergency Department for further evaluation.

## 2024-05-10 NOTE — ED PROVIDER NOTES
"History  Chief Complaint   Patient presents with    Head Injury     Hit in Rt side forehead by pole at work @ 14:40 today, sent here from urgent care. Dizziness & out of balance walking. Denies any visual problems     HPI    Patient is a 37 y/o F presenting from urgent care for evaluation of head injury. Pt Estonian speaking, has friend translating. Pt accidentally bent over at work and struck head on a metal pole. She denies LOC from the event but did feel her vision temporarily go black and she was seeing spots. Felt dizzy at the time, currently only c/o localized head pain in R temple at site of injury. She has no numbness/weakness, difficulty ambualting, vision change currently. Was told from  likely had concussion but \"wanted to confirm\" by sending to ED.     Prior to Admission Medications   Prescriptions Last Dose Informant Patient Reported? Taking?   Ferrous Sulfate Dried 200 (65 Fe) MG TABS   Yes No   Sig: Take 1 tablet by mouth 2 (two) times a day   medroxyPROGESTERone (DEPO-PROVERA) 150 mg/mL injection   No No   Sig: Inject 1 mL (150 mg total) into a muscle every 3 (three) months      Facility-Administered Medications Last Administration Doses Remaining   medroxyPROGESTERone (DEPO-PROVERA) IM injection 150 mg 3/11/2024  2:38 PM           Past Medical History:   Diagnosis Date    Anemia     Chlamydia     Septate uterus     Uterine fibroid        Past Surgical History:   Procedure Laterality Date     SECTION         Family History   Problem Relation Age of Onset    No Known Problems Mother     No Known Problems Father     No Known Problems Sister     No Known Problems Sister     No Known Problems Sister     No Known Problems Sister     No Known Problems Sister     No Known Problems Sister     No Known Problems Brother     No Known Problems Brother     No Known Problems Brother     No Known Problems Daughter     No Known Problems Daughter     Leukemia Son     Hypertension Maternal Grandmother     " Diabetes Maternal Grandfather     Colon cancer Neg Hx     Ovarian cancer Neg Hx     Breast cancer Neg Hx      I have reviewed and agree with the history as documented.    E-Cigarette/Vaping    E-Cigarette Use Never User      E-Cigarette/Vaping Substances    Nicotine No     THC No     CBD No     Flavoring No     Other No     Unknown No      Social History     Tobacco Use    Smoking status: Never    Smokeless tobacco: Never   Vaping Use    Vaping status: Never Used   Substance Use Topics    Alcohol use: Never     Comment: social    Drug use: Not Currently        Review of Systems   Constitutional:  Negative for chills and fever.   HENT:  Negative for ear pain and sore throat.    Eyes:  Negative for pain and visual disturbance.   Respiratory:  Negative for cough and shortness of breath.    Cardiovascular:  Negative for chest pain and palpitations.   Gastrointestinal:  Negative for abdominal pain and vomiting.   Genitourinary:  Negative for dysuria and hematuria.   Musculoskeletal:  Negative for arthralgias and back pain.   Skin:  Negative for color change and rash.   Neurological:  Negative for seizures and syncope.   All other systems reviewed and are negative.      Physical Exam  ED Triage Vitals [05/10/24 1815]   Temperature Pulse Respirations Blood Pressure SpO2   98.2 °F (36.8 °C) 68 16 122/79 100 %      Temp src Heart Rate Source Patient Position - Orthostatic VS BP Location FiO2 (%)   -- -- -- -- --      Pain Score       9             Orthostatic Vital Signs  Vitals:    05/10/24 1815   BP: 122/79   Pulse: 68       Physical Exam  Vitals and nursing note reviewed.   Constitutional:       General: She is not in acute distress.     Appearance: She is not ill-appearing.   HENT:      Head: Normocephalic and atraumatic.      Right Ear: External ear normal.      Left Ear: External ear normal.      Nose: Nose normal.      Mouth/Throat:      Pharynx: Oropharynx is clear.   Eyes:      Extraocular Movements: Extraocular  movements intact.      Pupils: Pupils are equal, round, and reactive to light.   Cardiovascular:      Rate and Rhythm: Normal rate and regular rhythm.      Pulses: Normal pulses.      Heart sounds: Normal heart sounds. No murmur heard.     No friction rub. No gallop.   Pulmonary:      Effort: Pulmonary effort is normal. No respiratory distress.      Breath sounds: Normal breath sounds. No wheezing, rhonchi or rales.   Abdominal:      General: Abdomen is flat. There is no distension.      Palpations: Abdomen is soft.      Tenderness: There is no abdominal tenderness. There is no guarding or rebound.   Musculoskeletal:         General: No deformity. Normal range of motion.      Cervical back: Normal range of motion. No rigidity or tenderness.      Right lower leg: No edema.      Left lower leg: No edema.   Skin:     General: Skin is warm and dry.      Capillary Refill: Capillary refill takes less than 2 seconds.      Findings: No rash.   Neurological:      General: No focal deficit present.      Mental Status: She is alert and oriented to person, place, and time.      Cranial Nerves: No cranial nerve deficit.      Sensory: No sensory deficit.      Motor: No weakness.      Coordination: Coordination normal.      Gait: Gait normal.   Psychiatric:         Mood and Affect: Mood normal.         ED Medications  Medications - No data to display    Diagnostic Studies  Results Reviewed       None                   No orders to display         Procedures  Procedures      ED Course                                       Medical Decision Making  Well appearing 39 y/o F presenting with mild concussion symptoms following head injury at work w/o LOC. VSS. No neurologic deficits, neck is nontender. No indication for CT imaging at this time. Concussion clinic referral placed, pt recommended to f/u with occupational medicine as this was a work related injury. RTED precautions given and pt discharged.           Disposition  Final  diagnoses:   Concussion     Time reflects when diagnosis was documented in both MDM as applicable and the Disposition within this note       Time User Action Codes Description Comment    5/10/2024  6:42 PM Herb Alexis Add [S06.0XAA] Concussion           ED Disposition       ED Disposition   Discharge    Condition   Stable    Date/Time   Fri May 10, 2024  6:42 PM    Comment   Justin Dawit discharge to home/self care.                   Follow-up Information       Follow up With Specialties Details Why Contact Info    [facility] [place]  Schedule an appointment as soon as possible for a visit   [place] [id number]  [phone]            Discharge Medication List as of 5/10/2024  6:47 PM        CONTINUE these medications which have NOT CHANGED    Details   Ferrous Sulfate Dried 200 (65 Fe) MG TABS Take 1 tablet by mouth 2 (two) times a day, Historical Med      medroxyPROGESTERone (DEPO-PROVERA) 150 mg/mL injection Inject 1 mL (150 mg total) into a muscle every 3 (three) months, Starting Mon 5/6/2024, Normal               PDMP Review       None             ED Provider  Attending physically available and evaluated Justin Dawit. I managed the patient along with the ED Attending.    Electronically Signed by           Herb Alexis MD  05/11/24 3329

## 2024-05-10 NOTE — ED ATTENDING ATTESTATION
"5/10/2024  IAngelina DO, saw and evaluated the patient. I have discussed the patient with the resident/non-physician practitioner and agree with the resident's/non-physician practitioner's findings, Plan of Care, and MDM as documented in the resident's/non-physician practitioner's note, except where noted. All available labs and Radiology studies were reviewed.  I was present for key portions of any procedure(s) performed by the resident/non-physician practitioner and I was immediately available to provide assistance.       Patient is a 38-year-old female Malay-speaking only here with family who helps translate.  Patient was at work today when she struck her head on a pole around 2 30-40 5 PM today.  Patient had immediate \"seeing stars and some pain\".  She took Motrin with relief.  Patient denies any nausea, vomiting, dizziness, ataxia.  There is no slurred speech or visual changes.  She has no neck pain and denies any paresthesias throughout the bilateral upper extremities or lower extremities.    On exam patient is resting in bed in no acute distress.  EOMI.  PERRLA.  Patient maintaining airway secretions.  Uvula midline without edema.  There is no traumatic injury identified on physical exam of her head or neck.  Normocephalic atraumatic.  There is no tenderness over the temporal region.  She moves bilateral upper extremities and lower extremity spontaneously which has independently and pain-free.  No respiratory distress.      Offered other medications while she is here however she states that her head is not that painful.  Referred to concussion clinic and return to ER instructions given.  They are agreeable.  Discussed with them that concussions are based on clinical and not CT.  At this time this is greater than 3 hours and patient is neuro intact without any acute findings such as vomiting, vision changes, persistent headache      Disclosure: Voice to text software was used in the preparation of " "this document and could have resulted in translational errors.      Occasional wrong word or \"sound a like\" substitutions may have occurred due to the inherent limitations of voice recognition software.  Read the chart carefully and recognize, using context, where substitutions have occurred.       I have independently reviewed external records are available to me to the level of detail possible within the time constraints of my patient care responsibilities in the ED.      "

## 2024-05-10 NOTE — Clinical Note
Justin Moreausumayanoah was seen and treated in our emergency department on 5/10/2024.                Diagnosis: Concussion    Justin  may return to work on return date.    She may return on this date: 05/11/2024         If you have any questions or concerns, please don't hesitate to call.      Herb Alexis MD    ______________________________           _______________          _______________  Hospital Representative                              Date                                Time

## 2024-05-15 ENCOUNTER — HOSPITAL ENCOUNTER (EMERGENCY)
Facility: HOSPITAL | Age: 39
Discharge: HOME | End: 2024-05-15
Attending: EMERGENCY MEDICINE
Payer: COMMERCIAL

## 2024-05-15 ENCOUNTER — APPOINTMENT (EMERGENCY)
Dept: CT IMAGING | Facility: HOSPITAL | Age: 39
End: 2024-05-15
Payer: COMMERCIAL

## 2024-05-15 VITALS
DIASTOLIC BLOOD PRESSURE: 55 MMHG | HEART RATE: 78 BPM | SYSTOLIC BLOOD PRESSURE: 97 MMHG | OXYGEN SATURATION: 100 % | TEMPERATURE: 97.9 F | RESPIRATION RATE: 18 BRPM | BODY MASS INDEX: 23.44 KG/M2 | WEIGHT: 140.87 LBS

## 2024-05-15 DIAGNOSIS — D25.9 UTERINE FIBROID: ICD-10-CM

## 2024-05-15 DIAGNOSIS — N39.0 UTI (URINARY TRACT INFECTION): ICD-10-CM

## 2024-05-15 DIAGNOSIS — N93.8 DYSFUNCTIONAL UTERINE BLEEDING: Primary | ICD-10-CM

## 2024-05-15 LAB
ALBUMIN SERPL BCP-MCNC: 4.3 G/DL (ref 3.5–5)
ALP SERPL-CCNC: 53 U/L (ref 34–104)
ALT SERPL W P-5'-P-CCNC: 7 U/L (ref 7–52)
ANION GAP SERPL CALCULATED.3IONS-SCNC: 5 MMOL/L (ref 4–13)
AST SERPL W P-5'-P-CCNC: 13 U/L (ref 13–39)
BACTERIA UR QL AUTO: ABNORMAL /HPF
BASOPHILS # BLD AUTO: 0.02 THOUSANDS/ÂΜL (ref 0–0.1)
BASOPHILS NFR BLD AUTO: 0 % (ref 0–1)
BILIRUB SERPL-MCNC: 1.34 MG/DL (ref 0.2–1)
BILIRUB UR QL STRIP: NEGATIVE
BUN SERPL-MCNC: 12 MG/DL (ref 5–25)
CALCIUM SERPL-MCNC: 9.4 MG/DL (ref 8.4–10.2)
CHLORIDE SERPL-SCNC: 102 MMOL/L (ref 96–108)
CLARITY UR: ABNORMAL
CO2 SERPL-SCNC: 28 MMOL/L (ref 21–32)
COLOR UR: ABNORMAL
CREAT SERPL-MCNC: 0.74 MG/DL (ref 0.6–1.3)
EOSINOPHIL # BLD AUTO: 0.02 THOUSAND/ÂΜL (ref 0–0.61)
EOSINOPHIL NFR BLD AUTO: 0 % (ref 0–6)
ERYTHROCYTE [DISTWIDTH] IN BLOOD BY AUTOMATED COUNT: 16.4 % (ref 11.6–15.1)
EXT PREGNANCY TEST URINE: NEGATIVE
EXT. CONTROL: NORMAL
GFR SERPL CREATININE-BSD FRML MDRD: 102 ML/MIN/1.73SQ M
GLUCOSE SERPL-MCNC: 93 MG/DL (ref 65–140)
GLUCOSE UR STRIP-MCNC: NEGATIVE MG/DL
HCT VFR BLD AUTO: 35.6 % (ref 34.8–46.1)
HGB BLD-MCNC: 12 G/DL (ref 11.5–15.4)
HGB UR QL STRIP.AUTO: ABNORMAL
IMM GRANULOCYTES # BLD AUTO: 0.04 THOUSAND/UL (ref 0–0.2)
IMM GRANULOCYTES NFR BLD AUTO: 0 % (ref 0–2)
KETONES UR STRIP-MCNC: NEGATIVE MG/DL
LEUKOCYTE ESTERASE UR QL STRIP: ABNORMAL
LYMPHOCYTES # BLD AUTO: 0.36 THOUSANDS/ÂΜL (ref 0.6–4.47)
LYMPHOCYTES NFR BLD AUTO: 3 % (ref 14–44)
MCH RBC QN AUTO: 29.3 PG (ref 26.8–34.3)
MCHC RBC AUTO-ENTMCNC: 33.7 G/DL (ref 31.4–37.4)
MCV RBC AUTO: 87 FL (ref 82–98)
MONOCYTES # BLD AUTO: 0.38 THOUSAND/ÂΜL (ref 0.17–1.22)
MONOCYTES NFR BLD AUTO: 4 % (ref 4–12)
MUCOUS THREADS UR QL AUTO: ABNORMAL
NEUTROPHILS # BLD AUTO: 9.76 THOUSANDS/ÂΜL (ref 1.85–7.62)
NEUTS SEG NFR BLD AUTO: 93 % (ref 43–75)
NITRITE UR QL STRIP: NEGATIVE
NON-SQ EPI CELLS URNS QL MICRO: ABNORMAL /HPF
NRBC BLD AUTO-RTO: 0 /100 WBCS
PH UR STRIP.AUTO: 5.5 [PH]
PLATELET # BLD AUTO: 186 THOUSANDS/UL (ref 149–390)
PMV BLD AUTO: 10.8 FL (ref 8.9–12.7)
POTASSIUM SERPL-SCNC: 3.9 MMOL/L (ref 3.5–5.3)
PROT SERPL-MCNC: 6.9 G/DL (ref 6.4–8.4)
PROT UR STRIP-MCNC: ABNORMAL MG/DL
RBC # BLD AUTO: 4.1 MILLION/UL (ref 3.81–5.12)
RBC #/AREA URNS AUTO: ABNORMAL /HPF
SODIUM SERPL-SCNC: 135 MMOL/L (ref 135–147)
SP GR UR STRIP.AUTO: 1.01 (ref 1–1.03)
UROBILINOGEN UR STRIP-ACNC: <2 MG/DL
WBC # BLD AUTO: 10.58 THOUSAND/UL (ref 4.31–10.16)
WBC #/AREA URNS AUTO: ABNORMAL /HPF

## 2024-05-15 PROCEDURE — 96372 THER/PROPH/DIAG INJ SC/IM: CPT

## 2024-05-15 PROCEDURE — 80053 COMPREHEN METABOLIC PANEL: CPT

## 2024-05-15 PROCEDURE — 74177 CT ABD & PELVIS W/CONTRAST: CPT

## 2024-05-15 PROCEDURE — 99284 EMERGENCY DEPT VISIT MOD MDM: CPT

## 2024-05-15 PROCEDURE — 87086 URINE CULTURE/COLONY COUNT: CPT

## 2024-05-15 PROCEDURE — 81025 URINE PREGNANCY TEST: CPT

## 2024-05-15 PROCEDURE — 81001 URINALYSIS AUTO W/SCOPE: CPT

## 2024-05-15 PROCEDURE — 85025 COMPLETE CBC W/AUTO DIFF WBC: CPT

## 2024-05-15 PROCEDURE — 96374 THER/PROPH/DIAG INJ IV PUSH: CPT

## 2024-05-15 PROCEDURE — 96361 HYDRATE IV INFUSION ADD-ON: CPT

## 2024-05-15 PROCEDURE — 99285 EMERGENCY DEPT VISIT HI MDM: CPT

## 2024-05-15 PROCEDURE — 36415 COLL VENOUS BLD VENIPUNCTURE: CPT

## 2024-05-15 RX ORDER — NAPROXEN 500 MG/1
500 TABLET ORAL 2 TIMES DAILY WITH MEALS
Qty: 30 TABLET | Refills: 0 | Status: SHIPPED | OUTPATIENT
Start: 2024-05-15

## 2024-05-15 RX ORDER — KETOROLAC TROMETHAMINE 30 MG/ML
15 INJECTION, SOLUTION INTRAMUSCULAR; INTRAVENOUS ONCE
Status: COMPLETED | OUTPATIENT
Start: 2024-05-15 | End: 2024-05-15

## 2024-05-15 RX ORDER — TRANEXAMIC ACID 10 MG/ML
1000 INJECTION, SOLUTION INTRAVENOUS ONCE
Status: COMPLETED | OUTPATIENT
Start: 2024-05-15 | End: 2024-05-15

## 2024-05-15 RX ORDER — CEPHALEXIN 500 MG/1
500 CAPSULE ORAL 2 TIMES DAILY
Qty: 14 CAPSULE | Refills: 0 | Status: SHIPPED | OUTPATIENT
Start: 2024-05-15 | End: 2024-05-22

## 2024-05-15 RX ADMIN — SODIUM CHLORIDE 1000 ML: 0.9 INJECTION, SOLUTION INTRAVENOUS at 12:46

## 2024-05-15 RX ADMIN — KETOROLAC TROMETHAMINE 15 MG: 30 INJECTION, SOLUTION INTRAMUSCULAR; INTRAVENOUS at 12:47

## 2024-05-15 RX ADMIN — TRANEXAMIC ACID 1000 MG: 10 INJECTION, SOLUTION INTRAVENOUS at 16:05

## 2024-05-15 RX ADMIN — IOHEXOL 90 ML: 350 INJECTION, SOLUTION INTRAVENOUS at 13:32

## 2024-05-15 NOTE — Clinical Note
accompanied Justin Pastor to the emergency department on 5/15/2024.    Return date if applicable: 05/16/2024        If you have any questions or concerns, please don't hesitate to call.      Asha Green PA-C

## 2024-05-15 NOTE — Clinical Note
Justin Pastor was seen and treated in our emergency department on 5/15/2024.                Diagnosis:     Justin  may return to work on return date.    She may return on this date: 05/16/2024         If you have any questions or concerns, please don't hesitate to call.      Asha Green PA-C    ______________________________           _______________          _______________  Hospital Representative                              Date                                Time

## 2024-05-15 NOTE — ED PROVIDER NOTES
History  Chief Complaint   Patient presents with    Vaginal Bleeding     Pt c/o R sided abdominal and flank pain x2 days. Pt also c/o heavy menstrual period that started 2 weeks ago. Pt reports saturating more than one pad an hour      Patient is a 38-year-old female with a history of uterine fibroids, anemia, and abnormal vaginal bleeding presenting for evaluation of abdominal pain.  Reports sharp constant right lower quadrant pain radiating into the right flank starting yesterday.  She has associated nausea, vomiting.  No urinary symptoms.  She is reporting heavy vaginal bleeding in the past 2 weeks that is continued to worsen.  States she goes through about 5 pads per day.  They started her on the Depo-Provera shot but states this has not helped with her periods.      Vaginal Bleeding  Associated symptoms: abdominal pain and nausea    Associated symptoms: no back pain, no dysuria and no fever        Prior to Admission Medications   Prescriptions Last Dose Informant Patient Reported? Taking?   Ferrous Sulfate Dried 200 (65 Fe) MG TABS   Yes No   Sig: Take 1 tablet by mouth 2 (two) times a day   medroxyPROGESTERone (DEPO-PROVERA) 150 mg/mL injection   No No   Sig: Inject 1 mL (150 mg total) into a muscle every 3 (three) months      Facility-Administered Medications Last Administration Doses Remaining   medroxyPROGESTERone (DEPO-PROVERA) IM injection 150 mg 3/11/2024  2:38 PM           Past Medical History:   Diagnosis Date    Anemia     Chlamydia     Septate uterus     Uterine fibroid        Past Surgical History:   Procedure Laterality Date     SECTION         Family History   Problem Relation Age of Onset    No Known Problems Mother     No Known Problems Father     No Known Problems Sister     No Known Problems Sister     No Known Problems Sister     No Known Problems Sister     No Known Problems Sister     No Known Problems Sister     No Known Problems Brother     No Known Problems Brother     No Known  Problems Brother     No Known Problems Daughter     No Known Problems Daughter     Leukemia Son     Hypertension Maternal Grandmother     Diabetes Maternal Grandfather     Colon cancer Neg Hx     Ovarian cancer Neg Hx     Breast cancer Neg Hx      I have reviewed and agree with the history as documented.    E-Cigarette/Vaping    E-Cigarette Use Never User      E-Cigarette/Vaping Substances    Nicotine No     THC No     CBD No     Flavoring No     Other No     Unknown No      Social History     Tobacco Use    Smoking status: Never    Smokeless tobacco: Never   Vaping Use    Vaping status: Never Used   Substance Use Topics    Alcohol use: Never     Comment: social    Drug use: Not Currently       Review of Systems   Constitutional:  Negative for chills and fever.   HENT:  Negative for ear pain and sore throat.    Eyes:  Negative for pain and visual disturbance.   Respiratory:  Negative for cough and shortness of breath.    Cardiovascular:  Negative for chest pain and palpitations.   Gastrointestinal:  Positive for abdominal pain and nausea. Negative for constipation, diarrhea and vomiting.   Genitourinary:  Positive for flank pain and vaginal bleeding. Negative for dysuria and hematuria.   Musculoskeletal:  Negative for arthralgias and back pain.   Skin:  Negative for color change and rash.   Neurological:  Negative for seizures, syncope and headaches.   All other systems reviewed and are negative.      Physical Exam  Physical Exam  Vitals and nursing note reviewed.   Constitutional:       General: She is not in acute distress.     Appearance: Normal appearance. She is not toxic-appearing.   HENT:      Head: Normocephalic and atraumatic.      Right Ear: External ear normal.      Left Ear: External ear normal.      Nose: Nose normal.      Mouth/Throat:      Mouth: Mucous membranes are moist.   Eyes:      General: No scleral icterus.        Right eye: No discharge.         Left eye: No discharge.      Extraocular  Movements: Extraocular movements intact.      Conjunctiva/sclera: Conjunctivae normal.      Pupils: Pupils are equal, round, and reactive to light.   Cardiovascular:      Rate and Rhythm: Normal rate and regular rhythm.      Pulses: Normal pulses.      Heart sounds: Normal heart sounds.   Pulmonary:      Effort: Pulmonary effort is normal. No respiratory distress.      Breath sounds: Normal breath sounds.   Abdominal:      Palpations: Abdomen is soft.      Tenderness: There is abdominal tenderness in the right upper quadrant and right lower quadrant. There is no right CVA tenderness, left CVA tenderness, guarding or rebound.   Musculoskeletal:         General: No tenderness, deformity or signs of injury.      Cervical back: Normal range of motion and neck supple.   Skin:     General: Skin is dry.      Coloration: Skin is not jaundiced.      Findings: No erythema or rash.   Neurological:      General: No focal deficit present.      Mental Status: She is alert and oriented to person, place, and time. Mental status is at baseline.      Motor: No weakness.      Gait: Gait normal.   Psychiatric:         Mood and Affect: Mood normal.         Behavior: Behavior normal.         Thought Content: Thought content normal.         Vital Signs  ED Triage Vitals   Temperature Pulse Respirations Blood Pressure SpO2   05/15/24 1208 05/15/24 1208 05/15/24 1208 05/15/24 1210 05/15/24 1208   97.9 °F (36.6 °C) (!) 107 18 94/55 100 %      Temp Source Heart Rate Source Patient Position - Orthostatic VS BP Location FiO2 (%)   05/15/24 1208 05/15/24 1208 05/15/24 1208 05/15/24 1208 --   Oral Monitor Sitting Right arm       Pain Score       05/15/24 1209       8           Vitals:    05/15/24 1210 05/15/24 1400 05/15/24 1500 05/15/24 1600   BP: 94/55 98/55 102/56 97/55   Pulse:  78 86 78   Patient Position - Orthostatic VS:  Sitting Sitting Lying         Visual Acuity      ED Medications  Medications   sodium chloride 0.9 % bolus 1,000 mL (0  mL Intravenous Stopped 5/15/24 1346)   ketorolac (TORADOL) injection 15 mg (15 mg Intramuscular Given 5/15/24 1247)   iohexol (OMNIPAQUE) 350 MG/ML injection (SINGLE-DOSE) 90 mL (90 mL Intravenous Given 5/15/24 1332)   tranexamic acid (CYKLOKAPRON) 1000-0.7 MG/100ML-% injection 1,000 mg (1,000 mg Intravenous New Bag 5/15/24 1605)       Diagnostic Studies  Results Reviewed       Procedure Component Value Units Date/Time    Urine Microscopic [[id number]]  (Abnormal) Collected: 05/15/24 1220    Lab Status: Final result Specimen: Urine, Clean Catch Updated: 05/15/24 1314     RBC, UA Innumerable /hpf      WBC, UA Innumerable /hpf      Epithelial Cells None Seen /hpf      Bacteria, UA None Seen /hpf      MUCUS THREADS Moderate    Urine culture [[id number]] Collected: 05/15/24 1220    Lab Status: In process Specimen: Urine, Clean Catch Updated: 05/15/24 1314    UA w Reflex to Microscopic w Reflex to Culture [[id number]]  (Abnormal) Collected: 05/15/24 1220    Lab Status: Final result Specimen: Urine, Clean Catch Updated: 05/15/24 1309     Color, UA Light Orange     Clarity, UA Turbid     Specific Gravity, UA 1.012     pH, UA 5.5     Leukocytes, UA Trace     Nitrite, UA Negative     Protein, UA Trace mg/dl      Glucose, UA Negative mg/dl      Ketones, UA Negative mg/dl      Urobilinogen, UA <2.0 mg/dl      Bilirubin, UA Negative     Occult Blood, UA Large    Comprehensive metabolic panel [[id number]]  (Abnormal) Collected: 05/15/24 1241    Lab Status: Final result Specimen: Blood from Arm, Right Updated: 05/15/24 1301     Sodium 135 mmol/L      Potassium 3.9 mmol/L      Chloride 102 mmol/L      CO2 28 mmol/L      ANION GAP 5 mmol/L      BUN 12 mg/dL      Creatinine 0.74 mg/dL      Glucose 93 mg/dL      Calcium 9.4 mg/dL      AST 13 U/L      ALT 7 U/L      Alkaline Phosphatase 53 U/L      Total Protein 6.9 g/dL      Albumin 4.3 g/dL      Total Bilirubin 1.34 mg/dL      eGFR 102 ml/min/1.73sq m     Narrative:      National Kid[employer]  D[employer] guidelines for Chronic Kidney Disease (CKD):     Stage 1 with normal or high GFR (GFR > 90 mL/min/1.73 square meters)    Stage 2 Mild CKD (GFR = 60-89 mL/min/1.73 square meters)    Stage 3A Moderate CKD (GFR = 45-59 mL/min/1.73 square meters)    Stage 3B Moderate CKD (GFR = 30-44 mL/min/1.73 square meters)    Stage 4 Severe CKD (GFR = 15-29 mL/min/1.73 square meters)    Stage 5 End Stage CKD (GFR <15 mL/min/1.73 square meters)  Note: GFR calculation is accurate only with a steady state creatinine    CBC and differential [[id number]]  (Abnormal) Collected: 05/15/24 1241    Lab Status: Final result Specimen: Blood from Arm, Right Updated: 05/15/24 1247     WBC 10.58 Thousand/uL      RBC 4.10 Million/uL      Hemoglobin 12.0 g/dL      Hematocrit 35.6 %      MCV 87 fL      MCH 29.3 pg      MCHC 33.7 g/dL      RDW 16.4 %      MPV 10.8 fL      Platelets 186 Thousands/uL      nRBC 0 /100 WBCs      Segmented % 93 %      Immature Grans % 0 %      Lymphocytes % 3 %      Monocytes % 4 %      Eosinophils Relative 0 %      Basophils Relative 0 %      Absolute Neutrophils 9.76 Thousands/µL      Absolute Immature Grans 0.04 Thousand/uL      Absolute Lymphocytes 0.36 Thousands/µL      Absolute Monocytes 0.38 Thousand/µL      Eosinophils Absolute 0.02 Thousand/µL      Basophils Absolute 0.02 Thousands/µL     Narrative:      This is an appended report.  These results have been appended to a previously verified report.    POCT pregnancy, urine [[id number]]  (Normal) Resulted: 05/15/24 1223    Lab Status: Final result Updated: 05/15/24 1223     EXT Preg Test, Ur Negative     Control Valid                   CT abdomen pelvis w contrast   Final Result by Norm Hopkins MD (05/15 1409)      No acute findings in the abdomen or pelvis.      Leiomyomatous uterus with large centrally necrotic fundal lesion with mass effect on the endometrial canal.         Workstation performed: [id number]                     Procedures  Procedures         ED Course  ED Course as of 05/15/24 1621   Wed May 15, 2024   1246 Hemoglobin: 12.0  Stable.   1305 WBC(!): 10.58   1305 Total Bilirubin(!): 1.34  Newly elevated. Remaining LFTs normal.   1451 TT sent to OB.   1451 CT abdomen pelvis w contrast  IMPRESSION:     No acute findings in the abdomen or pelvis.     Leiomyomatous uterus with large centrally necrotic fundal lesion with mass effect on the endometrial canal     1600 Per OB, can give dose of TXA if no contraindications or course of OCPs. Will give TXA and discharge.                               SBIRT 20yo+      Flowsheet Row Most Recent Value   Initial Alcohol Screen: US AUDIT-C     1. How often do you have a drink containing alcohol? 0 Filed at: 05/15/2024 1539   2. How many drinks containing alcohol do you have on a typical day you are drinking?  0 Filed at: 05/15/2024 1539   3a. Male UNDER 65: How often do you have five or more drinks on one occasion? 0 Filed at: 05/15/2024 1539   3b. FEMALE Any Age, or MALE 65+: How often do you have 4 or more drinks on one occassion? 0 Filed at: 05/15/2024 1539   Audit-C Score 0 Filed at: 05/15/2024 1539   KALEN: How many times in the past year have you...    Used an illegal drug or used a prescription medication for non-medical reasons? Never Filed at: 05/15/2024 1539                      Medical Decision Making  Patient is a 38-year-old female presenting with right-sided lower abdominal pain rating into right flank and vaginal bleeding.  She is tachycardic on arrival but remaining vitals are stable.  DDx include outlined to: Appendicitis, cholecystitis, cholelithiasis, pyelonephritis, nephrolithiasis, uterine fibroids, ovarian pathology, ectopic pregnancy.  Plan: CBC, CMP, UA, urine pregnancy, CT ab pelvis.  Will treat symptomatically with fluids and Toradol.    WBC with the mildly elevated 10.58.  Hemoglobin is stable at 12 so no concern for significant hemorrhage.  No lecture  abnormalities or COLIN.  Total bili is newly elevated at 1.34 however remaining LFTs normal and there are no corresponding CT findings.  UA does have innumerable white blood cells and blood which is likely vaginal.  Given flank pain and inflammatory cells, will treat for UTI with Keflex.  CT shows leiomyomatous uterus with large centrally necrotic fundal lesion with mass effect on endometrial canal.  Discussed with OB who stated fibroid can be followed up outpatient given that patient is stable.  Can give TXA or short course of OCPs for bleeding so provided patient with 1 g TXA in the ED.  Advised her to follow-up outpatient with OB.    I have discussed findings and plan for discharge with the patient/caregiver. Follow up with the appropriate providers including primary care physician was discussed. Return precautions discussed with patient/caregiver as outlined in AVS. Patient/caregiver verbally expressed understanding. Patient stable at time of discharge and ambulated out of the emergency department.           Amount and/or Complexity of Data Reviewed  Labs: ordered. Decision-making details documented in ED Course.  Radiology: ordered. Decision-making details documented in ED Course.    Risk  Prescription drug management.             Disposition  Final diagnoses:   Dysfunctional uterine bleeding   UTI (urinary tract infection)   Uterine fibroid     Time reflects when diagnosis was documented in both MDM as applicable and the Disposition within this note       Time User Action Codes Description Comment    5/15/2024  3:45 PM Asha Green Add [N93.8] Dysfunctional uterine bleeding     5/15/2024  3:45 PM Asha Green Add [N39.0] UTI (urinary tract infection)     5/15/2024  3:45 PM Asha Green Add [D25.9] Uterine fibroid           ED Disposition       ED Disposition   Discharge    Condition   Stable    Date/Time   Wed May 15, 2024 7751    Comment   Justin Pastor discharge to home/self care.                    Follow-up Information       Follow up With Specialties Details Why Contact Info Additional Information    [facility] Schedule an appointment as soon as possible for a visit   [address] [place] [address]  [address]  [facility] [address] [phone] [facility], [address] [place] [address], [address], [place], [place], [address] [phone]            Patient's Medications   Discharge Prescriptions    CEPHALEXIN (KEFLEX) 500 MG CAPSULE    Take 1 capsule (500 mg total) by mouth 2 (two) times a day for 7 days       Start Date: 5/15/2024 End Date: 5/22/2024       Order Dose: 500 mg       Quantity: 14 capsule    Refills: 0    NAPROXEN (NAPROSYN) 500 MG TABLET    Take 1 tablet (500 mg total) by mouth 2 (two) times a day with meals       Start Date: 5/15/2024 End Date: --       Order Dose: 500 mg       Quantity: 30 tablet    Refills: 0       No discharge procedures on file.    PDMP Review       None            ED Provider  Electronically Signed by             Asha Green PA-C  05/15/24 6220

## 2024-05-16 LAB — BACTERIA UR CULT: NORMAL

## 2025-05-06 PROBLEM — N92.0 MENORRHAGIA WITH REGULAR CYCLE: Status: RESOLVED | Noted: 2024-05-06 | Resolved: 2025-05-06

## 2025-05-07 ENCOUNTER — TELEPHONE (OUTPATIENT)
Dept: OBGYN CLINIC | Facility: MEDICAL CENTER | Age: 40
End: 2025-05-07

## 2025-05-07 NOTE — TELEPHONE ENCOUNTER
Who called:STAFF     Is the patient Pregnant ? no  If so, How many weeks?     Reason for the Call: LVM to reschedule patients 05/09 appt    Action Taken: LVM to have patient call back    Outcome/Plan/ Recommendations: Reschedule patients appt